# Patient Record
(demographics unavailable — no encounter records)

---

## 2024-10-09 NOTE — DISCUSSION/SUMMARY
[Normal Growth] : growth [Normal Development] : development [None] : No known medical problems [No Elimination Concerns] : elimination [No Feeding Concerns] : feeding [No Skin Concerns] : skin [Normal Sleep Pattern] : sleep [Family Adaptation] : family adaptation [Infant [place]] : infant independence [Feeding Routine] : feeding routine [Safety] : safety [No Medications] : ~He/She~ is not on any medications [Parent/Guardian] : parent/guardian

## 2024-10-09 NOTE — DISCUSSION/SUMMARY
[Normal Growth] : growth [Normal Development] : development [None] : No known medical problems [No Elimination Concerns] : elimination [No Feeding Concerns] : feeding [No Skin Concerns] : skin [Normal Sleep Pattern] : sleep [Family Adaptation] : family adaptation [Infant [place]] : infant independence [Feeding Routine] : feeding routine [Safety] : safety [No Medications] : ~He/She~ is not on any medications [Parent/Guardian] : parent/guardian

## 2024-10-10 NOTE — DEVELOPMENTAL MILESTONES
[Normal Development] : Normal Development [None] : none [Uses basic gestures] : uses basic gestures [Says "Filippo" or "Mama"] : says "Filippo" or "Mama" nonspecifically [Sits well without support] : sits well without support [Transitions between sitting and lying] : transitions between sitting and lying [Balances on hands and knees] : balances on hands and knees [Releases objects intentionally] : releases objects intentionally [[place] objects together] : bangs objects together [Yes] : Completed. [Crawls] : does not crawl [Picks up small objects with 3 fingers] : does not  small objects with 3 fingers and thumb

## 2024-10-10 NOTE — PHYSICAL EXAM
[Alert] : alert [Normocephalic] : normocephalic [Flat Open Anterior [place]] : flat open anterior fontanelle [Red Reflex] : red reflex bilateral [PERRL] : PERRL [Normally Placed Ears] : normally placed ears [Auricles Well Formed] : auricles well formed [Clear Tympanic membranes] : clear tympanic membranes [Light reflex present] : light reflex present [Bony landmarks visible] : bony landmarks visible [Nares Patent] : nares patent [Palate Intact] : palate intact [Uvula Midline] : uvula midline [Supple, full passive range of motion] : supple, full passive range of motion [Symmetric Chest Rise] : symmetric chest rise [Clear to Auscultation Bilaterally] : clear to auscultation bilaterally [Regular Rate and Rhythm] : regular rate and rhythm [S1, S2 present] : S1, S2 present [+2 Femoral Pulses] : (+) 2 femoral pulses [Soft] : soft [Bowel Sounds] : bowel sounds present [Normal External Genitalia] : normal external genitalia [Normal Vaginal Introitus] : normal vaginal introitus [No Abnormal Lymph Nodes Palpated] : no abnormal lymph nodes palpated [Symmetric abduction and rotation of hips] : symmetric abduction and rotation of hips [Straight] : straight [Cranial Nerves Grossly Intact] : cranial nerves grossly intact [Dermal Melanocytosis] : Dermal Melanocytosis [Acute Distress] : no acute distress [Excessive Tearing] : no excessive tearing [Discharge] : no discharge [Palpable Masses] : no palpable masses [Murmurs] : no murmurs [Tender] : nontender [Distended] : nondistended [Hepatomegaly] : no hepatomegaly [Splenomegaly] : no splenomegaly [Clitoromegaly] : no clitoromegaly [Allis Sign] : negative Allis sign [Rash or Lesions] : no rash/lesions [de-identified] : [place] on upper buttock, dry skin

## 2024-10-10 NOTE — PLAN
[TextEntry] : Continue breast milk or formula as desired. Increase table foods and with family mealtimes, 3 meals with 2-3 snacks per day. Incorporate up to 6 oz of water daily in a sippy / straw cup. Discussed weaning of bottle and pacifier.    When in car, patient should be in rear-facing car seat in back seat, do not leave baby in car for any reason or any time. Put baby to sleep on back, in own crib with no loose or soft bedding, no co-sleeping. Can use sunscreen if if outside, do not put baby in direct sunlight. Help baby to develop consistent sleep and feeding routines. May offer pacifier if needed. Avoid screen/tv time at this age. Wash hands often, especially after changing diaper. Keep tobacco/vape free household and environment. Encourage family/caregivers to get protective vaccinations. Use sunscreen when outside, avoid direct sunlight. Recognize stranger anxiety. Ensure home is safe due to baby being increasingly mobile. Do not leave baby alone. Use consistent, positive discipline. Read and sing aloud to baby.   No propping bottles, and no bottles in bed, wash/clean teeth with wash cloth or a soft toothbrush. Start MV with fluoride when tooth appears Do not give baby honey. Avoid sweetened drinks/juices. Avoid choking by limiting finger foods to smaller than a cheerio. No hard/compressible foods. Continue daily multivitamin with fluoride.  If concerned about not eating as much as 3 meals a day, return sooner for weight check in btw now and 1 year visit    Return for 12 month well visit or sooner if needed or if concerns arise.

## 2024-10-10 NOTE — HISTORY OF PRESENT ILLNESS
[Parents] : parents [Well-balanced] : well-balanced [Eggs] : eggs [Fish] : fish [Peanut] : peanut [Dairy] : dairy [Normal] : Normal [Frequency of stools: ___] : Frequency of stools: [unfilled]  stools [per day] : per day. [In Crib] : sleeps in crib [Sleeps 12-16 hours per 24 hours (including naps)] : sleeps 12-16 hours per 24 hours (including naps) [Water heater temperature set at <120 degrees F] : Water heater temperature set at <120 degrees F [Rear facing car seat in  back seat] : Rear facing car seat in  back seat [Carbon Monoxide Detectors] : Carbon monoxide detectors [Smoke Detectors] : Smoke detectors [Up to date] : Up to date [Loose bedding, pillow, toys, and/or bumpers in crib] : no loose bedding, pillow, toys, and/or bumpers in crib [Brushing teeth] : not brushing teeth [No] : Not at  exposure [FreeTextEntry7] : 9month well visit  [de-identified] : [place] cow's milk organic formula started 2 months ago, taking about 27 oz a day with 1 meal or so a day  had peanuts once  tried yogurts  [de-identified] : no teeth yet  [FreeTextEntry1] : No specialists   Eczema doing well, no active outbreak

## 2024-10-10 NOTE — HISTORY OF PRESENT ILLNESS
[Parents] : parents [Well-balanced] : well-balanced [Eggs] : eggs [Fish] : fish [Peanut] : peanut [Dairy] : dairy [Normal] : Normal [Frequency of stools: ___] : Frequency of stools: [unfilled]  stools [per day] : per day. [In Crib] : sleeps in crib [Sleeps 12-16 hours per 24 hours (including naps)] : sleeps 12-16 hours per 24 hours (including naps) [Water heater temperature set at <120 degrees F] : Water heater temperature set at <120 degrees F [Rear facing car seat in  back seat] : Rear facing car seat in  back seat [Carbon Monoxide Detectors] : Carbon monoxide detectors [Smoke Detectors] : Smoke detectors [Up to date] : Up to date [Loose bedding, pillow, toys, and/or bumpers in crib] : no loose bedding, pillow, toys, and/or bumpers in crib [Brushing teeth] : not brushing teeth [No] : Not at  exposure [FreeTextEntry7] : 9month well visit  [de-identified] : [place] cow's milk organic formula started 2 months ago, taking about 27 oz a day with 1 meal or so a day  had peanuts once  tried yogurts  [de-identified] : no teeth yet  [FreeTextEntry1] : No specialists   Eczema doing well, no active outbreak

## 2024-10-10 NOTE — PHYSICAL EXAM
[Alert] : alert [Normocephalic] : normocephalic [Flat Open Anterior [place]] : flat open anterior fontanelle [Red Reflex] : red reflex bilateral [PERRL] : PERRL [Normally Placed Ears] : normally placed ears [Auricles Well Formed] : auricles well formed [Clear Tympanic membranes] : clear tympanic membranes [Light reflex present] : light reflex present [Bony landmarks visible] : bony landmarks visible [Nares Patent] : nares patent [Palate Intact] : palate intact [Uvula Midline] : uvula midline [Supple, full passive range of motion] : supple, full passive range of motion [Symmetric Chest Rise] : symmetric chest rise [Clear to Auscultation Bilaterally] : clear to auscultation bilaterally [Regular Rate and Rhythm] : regular rate and rhythm [S1, S2 present] : S1, S2 present [+2 Femoral Pulses] : (+) 2 femoral pulses [Soft] : soft [Bowel Sounds] : bowel sounds present [Normal External Genitalia] : normal external genitalia [Normal Vaginal Introitus] : normal vaginal introitus [No Abnormal Lymph Nodes Palpated] : no abnormal lymph nodes palpated [Symmetric abduction and rotation of hips] : symmetric abduction and rotation of hips [Straight] : straight [Cranial Nerves Grossly Intact] : cranial nerves grossly intact [Dermal Melanocytosis] : Dermal Melanocytosis [Acute Distress] : no acute distress [Excessive Tearing] : no excessive tearing [Discharge] : no discharge [Palpable Masses] : no palpable masses [Murmurs] : no murmurs [Tender] : nontender [Distended] : nondistended [Hepatomegaly] : no hepatomegaly [Splenomegaly] : no splenomegaly [Clitoromegaly] : no clitoromegaly [Allis Sign] : negative Allis sign [Rash or Lesions] : no rash/lesions [de-identified] : [place] on upper buttock, dry skin

## 2024-10-10 NOTE — DEVELOPMENTAL MILESTONES
[Normal Development] : Normal Development [None] : none [Uses basic gestures] : uses basic gestures [Says "Filippo" or "Mama"] : says "Filippo" or "Mama" nonspecifically [Sits well without support] : sits well without support [Transitions between sitting and lying] : transitions between sitting and lying [Balances on hands and knees] : balances on hands and knees [Releases objects intentionally] : releases objects intentionally [[place] objects together] : bangs objects together [Yes] : Completed. [Crawls] : does not crawl [Picks up small objects with 3 fingers] : does not  small objects with 3 fingers and thumb

## 2025-01-17 NOTE — PLAN
[TextEntry] : Continue breast feeding if applicable, or transition to whole milk, limit to 16-18 oz.  Continue table foods to 3x/day. Increase to 2-3 servings of proteins a day and offer wide variety of textures and healthy food groups. Encourage sitting and eating meals at table with family. Incorporate water with sippy cup and avoid sweetened juices/drinks.   When in car, patient should be in rear-facing car seat in back seat up to 2 years old, do not leave baby in car for any reason or any time. Put baby to sleep on back, in own crib with no loose or soft bedding, no co-sleeping. Help baby to develop consistent sleep and feeding routines, daily routines such as brushing and cleaning teeth twice daily after breakfast and before bed. Continue to allow for 1 nap/day. Avoid screen/tv time at this age. Wash hands often, especially after changing diaper. Keep tobacco/vape free household and environment. Encourage family/caregivers to get protective vaccinations. Use sunscreen when outside, avoid direct sunlight. Recognize stranger and separation anxiety. Ensure home is safe due to baby being increasingly mobile. Do not leave baby alone. Use consistent, positive discipline. Read and sing aloud to baby.   No propping bottles, and no bottles in bed, wash/clean teeth with wash cloth or a soft toothbrush Avoid choking by limiting finger foods to smaller than a cheerio. No hard/compressible foods. Continue daily multivitamin with fluoride.   [ Vaccinations given at this visit can cause low grade temperature, irritability, and site reaction -- can use warm compress and Tylenol / Ibuprofen as needed ]   Return for 15 month well visit or sooner if needed or if concerns arise.

## 2025-01-17 NOTE — PHYSICAL EXAM
[Alert] : alert [Consolable] : consolable [Playful] : playful [Normocephalic] : normocephalic [Closed Anterior [place]] : closed anterior fontanelle [Red Reflex] : red reflex bilateral [PERRL] : PERRL [Normally Placed Ears] : normally placed ears [Auricles Well Formed] : auricles well formed [Clear Tympanic membranes] : clear tympanic membranes [Light reflex present] : light reflex present [Bony landmarks visible] : bony landmarks visible [Discharge] : no discharge [Nares Patent] : nares patent [Palate Intact] : palate intact [Uvula Midline] : uvula midline [Tooth Eruption] : tooth eruption [Supple, full passive range of motion] : supple, full passive range of motion [Palpable Masses] : no palpable masses [Symmetric Chest Rise] : symmetric chest rise [Clear to Auscultation Bilaterally] : clear to auscultation bilaterally [Regular Rate and Rhythm] : regular rate and rhythm [S1, S2 present] : S1, S2 present [Murmurs] : no murmurs [+2 Femoral Pulses] : (+) 2 femoral pulses [Soft] : soft [Tender] : nontender [Distended] : nondistended [Bowel Sounds] : normoactive bowel sounds [Hepatomegaly] : no hepatomegaly [Splenomegaly] : no splenomegaly [Normal External Genitalia] : normal external genitalia [Clitoromegaly] : no clitoromegaly [Normal Vaginal Introitus] : normal vaginal introitus [No Abnormal Lymph Nodes Palpated] : no abnormal lymph nodes palpated [Symmetric Abduction and Rotation of Hips] : symmetric abduction and rotation of hips [Allis Sign] : negative Allis sign [Straight] : straight [Cranial Nerves Grossly Intact] : cranial nerves grossly intact [Rash or Lesions] : no rash/lesions [de-identified] : dry skin, few flaky patches on back, no swelling or redness

## 2025-01-17 NOTE — HISTORY OF PRESENT ILLNESS
[Parents] : parents [Normal] : Normal [Vitamin] : Primary Fluoride Source: Vitamin [No] : Not at  exposure [Water heater temperature set at <120 degrees F] : Water heater temperature set at <120 degrees F [Car seat in back seat] : Car seat in back seat [Smoke Detectors] : Smoke detectors [Exposure to electronic nicotine delivery system] : No exposure to electronic nicotine delivery system [Carbon Monoxide Detectors] : Carbon monoxide detectors [At risk for exposure to TB] : Not at risk for exposure to Tuberculosis [FreeTextEntry7] : 12 month well visit  [de-identified] : Likes fruit and is picky sometimes, drinks formula still about 28 oz a day, waiting for today to transition to whole milk [FreeTextEntry1] : NO specialists

## 2025-01-17 NOTE — DEVELOPMENTAL MILESTONES
[Normal Development] : Normal Development [None] : none [Looks for hidden objects] : looks for hidden objects [Imitates new gestures] : imitates new gestures [Says "Dad" or "Mom" with meaning] : says "Dad" or "Mom" with meaning [Follows a verbal command that] : follows a verbal command that includes a gesture [Takes first independent] : takes first independent steps [Stands without support] : stands without support [Drops object in a cup] : drops object in a cup [Picks up small object with 2 finger] : picks up small object with 2 finger pincer grasp [Picks up food and eats it] : picks up food and eats it [FreeTextEntry1] : GM -14-2 FMA - 19-1 PS -17 L - 16-2

## 2025-01-17 NOTE — HISTORY OF PRESENT ILLNESS
[Parents] : parents [Normal] : Normal [Vitamin] : Primary Fluoride Source: Vitamin [No] : Not at  exposure [Water heater temperature set at <120 degrees F] : Water heater temperature set at <120 degrees F [Car seat in back seat] : Car seat in back seat [Smoke Detectors] : Smoke detectors [Exposure to electronic nicotine delivery system] : No exposure to electronic nicotine delivery system [Carbon Monoxide Detectors] : Carbon monoxide detectors [At risk for exposure to TB] : Not at risk for exposure to Tuberculosis [FreeTextEntry7] : 12 month well visit  [de-identified] : Likes fruit and is picky sometimes, drinks formula still about 28 oz a day, waiting for today to transition to whole milk [FreeTextEntry1] : NO specialists

## 2025-01-17 NOTE — PHYSICAL EXAM
[Alert] : alert [Consolable] : consolable [Playful] : playful [Normocephalic] : normocephalic [Closed Anterior [place]] : closed anterior fontanelle [Red Reflex] : red reflex bilateral [PERRL] : PERRL [Normally Placed Ears] : normally placed ears [Auricles Well Formed] : auricles well formed [Clear Tympanic membranes] : clear tympanic membranes [Light reflex present] : light reflex present [Bony landmarks visible] : bony landmarks visible [Discharge] : no discharge [Nares Patent] : nares patent [Palate Intact] : palate intact [Uvula Midline] : uvula midline [Tooth Eruption] : tooth eruption [Supple, full passive range of motion] : supple, full passive range of motion [Palpable Masses] : no palpable masses [Symmetric Chest Rise] : symmetric chest rise [Clear to Auscultation Bilaterally] : clear to auscultation bilaterally [Regular Rate and Rhythm] : regular rate and rhythm [S1, S2 present] : S1, S2 present [Murmurs] : no murmurs [+2 Femoral Pulses] : (+) 2 femoral pulses [Soft] : soft [Tender] : nontender [Distended] : nondistended [Bowel Sounds] : normoactive bowel sounds [Hepatomegaly] : no hepatomegaly [Splenomegaly] : no splenomegaly [Normal External Genitalia] : normal external genitalia [Clitoromegaly] : no clitoromegaly [Normal Vaginal Introitus] : normal vaginal introitus [No Abnormal Lymph Nodes Palpated] : no abnormal lymph nodes palpated [Symmetric Abduction and Rotation of Hips] : symmetric abduction and rotation of hips [Allis Sign] : negative Allis sign [Straight] : straight [Cranial Nerves Grossly Intact] : cranial nerves grossly intact [Rash or Lesions] : no rash/lesions [de-identified] : dry skin, few flaky patches on back, no swelling or redness

## 2025-01-17 NOTE — DISCUSSION/SUMMARY
[Normal Growth] : growth [Normal Development] : development [None] : No known medical problems [No Elimination Concerns] : elimination [No Feeding Concerns] : feeding [No Skin Concerns] : skin [Normal Sleep Pattern] : sleep [Family Support] : family support [Establishing Routines] : establishing routines [Feeding and Appetite Changes] : feeding and appetite changes [Establishing A Dental Home] : establishing a dental home [Safety] : safety [No Medications] : ~He/She~ is not on any medications [Parent/Guardian] : parent/guardian [] : The components of the vaccine(s) to be administered today are listed in the plan of care. The disease(s) for which the vaccine(s) are intended to prevent and the risks have been discussed with the caretaker.  The risks are also included in the appropriate vaccination information statements which have been provided to the patient's caregiver.  The caregiver has given consent to vaccinate. [FreeTextEntry1] : defer flu

## 2025-06-10 NOTE — DEVELOPMENTAL MILESTONES
[Normal Development] : Normal Development [Imitates scribbling] : imitates scribbling [Speaks in sounds that seem like] : speaks in sounds that seem like an unknown language [Follows directions that do not] : follows direction that do not include a gesture [Looks when parent says,] : looks when parent says, "Where is...?" [Squats to  objects] : squats to  objects [Crawls up a few steps] : crawls up a few steps [Makes chase with crayon] : makes chase with nhiyon [Drops object into and takes object] : drops object into and takes object out of container

## 2025-06-10 NOTE — HISTORY OF PRESENT ILLNESS
[Parents] : parents [Cow's milk (Ounces per day ___)] : consumes [unfilled] oz of cow's milk per day [Normal] : Normal [Brushing teeth] : Brushing teeth [Vitamin] : Primary Fluoride Source: Vitamin [No] : Not at  exposure [Water heater temperature set at <120 degrees F] : Water heater temperature set at <120 degrees F [Car seat in back seat] : Car seat in back seat [Carbon Monoxide Detectors] : Carbon monoxide detectors [Smoke Detectors] : Smoke detectors [Exposure to electronic nicotine delivery system] : Exposure to electronic nicotine delivery system

## 2025-07-24 NOTE — HISTORY OF PRESENT ILLNESS
[Parents] : parents [Toothpaste] : Primary Fluoride Source: Toothpaste [No] : Not at  exposure [Water heater temperature set at <120 degrees F] : Water heater temperature set at <120 degrees F [Car seat in back seat] : Car seat in back seat [Carbon Monoxide Detectors] : Carbon monoxide detectors [Smoke Detectors] : Smoke detectors [Up to date] : Up to date [Fruit] : fruit [Vegetables] : vegetables [Eggs] : eggs [Table food] : table food [Vitamin ___] : Patient takes [unfilled] vitamin daily  [Normal] : Normal [Brushing teeth] : Brushing teeth [Exposure to electronic nicotine delivery system] : No exposure to electronic nicotine delivery system [FreeTextEntry7] : 18 month wcc [de-identified] : eatings 3 meals per day, about 8-12 oz milk per day [de-identified] : water in cup  [FreeTextEntry1] : swimming classes  understands Cantonese and English, has few words in English, no more than 3-4   cardio did not think sweating was cardio related, cleared, told to observe and dress lightly at night   no other specialists  did not see nutritionist, eating much better

## 2025-07-24 NOTE — DEVELOPMENTAL MILESTONES
[Normal Development] : Normal Development [None] : none [Engages with others for play] : engages with others for play [Help dress and undress self] : help dress and undress self [Points to object of interest to] : points to object of interest to draw attention to it [Turns and looks at adult if] : turns and looks at adult if something new happens [Begins to scoop with spoon] : begins to scoop with spoon [Uses 6 to 10 words other than] : uses 6 to 10 words other than names [Walks up with 2 feet per step] : walks up with 2 feet per step with hand held [Sits in small chair] : sits in small chair [Carries toy while walking] : carries toy while walking [Scribbles spontaneously] : scribbles spontaneously [Throws small ball a few feet] : throws a small ball a few feet while standing [Passed] : passed [Identifies at least 2 body parts] : does not indentify at least 2 body parts [Yes] : Completed. [FreeTextEntry1] : reviewed bottom portion of swyc -- discussed separation anxiety and to encourage more time with other children/day programs to help with separation and to interact with other children

## 2025-07-24 NOTE — PLAN
[TextEntry] : Continue whole cow's milk. Continue table foods, 3 meals with 2-3 snacks per day. Continue MV w/ fluoride. Brush teeth twice a day with soft toothbrush. Recommend visit to dentist. When in car, keep child in rear-facing car seats until age 2, or until  the maximum height and weight for seat is reached. Put toddler to sleep in own bed or crib. Help toddler to maintain consistent daily routines and sleep schedule. Toilet training discussed. Recognize anxiety in new settings. Ensure home is safe. Be within arm's reach of toddler at all times. Use consistent, positive discipline. Read aloud to toddler. Continue feeding regimen, adequate weight gain and percentiles maintained, return in 2-3 months if concerned about eating to check weight, if eating well and no concerns then return for 2 yr WC, or sooner if any other new concerns arise

## 2025-07-24 NOTE — PHYSICAL EXAM
[Alert] : alert [No Acute Distress] : no acute distress [Normocephalic] : normocephalic [Anterior [place] Closed] : anterior fontanelle closed [Red Reflex Bilateral] : red reflex bilateral [PERRL] : PERRL [Normally Placed Ears] : normally placed ears [Auricles Well Formed] : auricles well formed [Clear Tympanic membranes with present light reflex and bony landmarks] : clear tympanic membranes with present light reflex and bony landmarks [No Discharge] : no discharge [Nares Patent] : nares patent [Palate Intact] : palate intact [Uvula Midline] : uvula midline [Tooth Eruption] : tooth eruption  [Supple, full passive range of motion] : supple, full passive range of motion [No Palpable Masses] : no palpable masses [Symmetric Chest Rise] : symmetric chest rise [Clear to Auscultation Bilaterally] : clear to auscultation bilaterally [Regular Rate and Rhythm] : regular rate and rhythm [S1, S2 present] : S1, S2 present [No Murmurs] : no murmurs [Soft] : soft [NonTender] : non tender [Non Distended] : non distended [Normoactive Bowel Sounds] : normoactive bowel sounds [No Hepatomegaly] : no hepatomegaly [No Splenomegaly] : no splenomegaly [Andre 1] : Andre 1 [No Clitoromegaly] : no clitoromegaly [Normal Vaginal Introitus] : normal vaginal introitus [Patent] : patent [Normally Placed] : normally placed [No Abnormal Lymph Nodes Palpated] : no abnormal lymph nodes palpated [No Clavicular Crepitus] : no clavicular crepitus [Symmetric Buttocks Creases] : symmetric buttocks creases [No Spinal Dimple] : no spinal dimple [NoTuft of Hair] : no tuft of hair [Cranial Nerves Grossly Intact] : cranial nerves grossly intact [No Rash or Lesions] : no rash or lesions